# Patient Record
Sex: FEMALE | Race: WHITE | NOT HISPANIC OR LATINO
[De-identification: names, ages, dates, MRNs, and addresses within clinical notes are randomized per-mention and may not be internally consistent; named-entity substitution may affect disease eponyms.]

---

## 2021-12-09 PROBLEM — Z00.00 ENCOUNTER FOR PREVENTIVE HEALTH EXAMINATION: Status: ACTIVE | Noted: 2021-12-09

## 2021-12-13 ENCOUNTER — APPOINTMENT (OUTPATIENT)
Dept: MAMMOGRAPHY | Facility: CLINIC | Age: 61
End: 2021-12-13
Payer: COMMERCIAL

## 2021-12-13 ENCOUNTER — APPOINTMENT (OUTPATIENT)
Dept: ULTRASOUND IMAGING | Facility: CLINIC | Age: 61
End: 2021-12-13
Payer: COMMERCIAL

## 2021-12-13 PROCEDURE — 77063 BREAST TOMOSYNTHESIS BI: CPT

## 2021-12-13 PROCEDURE — 76641 ULTRASOUND BREAST COMPLETE: CPT | Mod: RT

## 2021-12-13 PROCEDURE — 77067 SCR MAMMO BI INCL CAD: CPT

## 2021-12-21 ENCOUNTER — OUTPATIENT (OUTPATIENT)
Dept: OUTPATIENT SERVICES | Facility: HOSPITAL | Age: 61
LOS: 1 days | End: 2021-12-21
Payer: COMMERCIAL

## 2021-12-21 ENCOUNTER — APPOINTMENT (OUTPATIENT)
Dept: ULTRASOUND IMAGING | Facility: IMAGING CENTER | Age: 61
End: 2021-12-21
Payer: COMMERCIAL

## 2021-12-21 DIAGNOSIS — Z00.8 ENCOUNTER FOR OTHER GENERAL EXAMINATION: ICD-10-CM

## 2021-12-21 PROCEDURE — 76642 ULTRASOUND BREAST LIMITED: CPT | Mod: 26,LT

## 2021-12-21 PROCEDURE — 76642 ULTRASOUND BREAST LIMITED: CPT

## 2023-02-27 ENCOUNTER — OFFICE (OUTPATIENT)
Dept: URBAN - METROPOLITAN AREA CLINIC 77 | Facility: CLINIC | Age: 63
Setting detail: OPHTHALMOLOGY
End: 2023-02-27
Payer: COMMERCIAL

## 2023-02-27 ENCOUNTER — OFFICE (OUTPATIENT)
Facility: LOCATION | Age: 63
Setting detail: OPHTHALMOLOGY
End: 2023-02-27
Payer: COMMERCIAL

## 2023-02-27 ENCOUNTER — RX ONLY (RX ONLY)
Age: 63
End: 2023-02-27

## 2023-02-27 DIAGNOSIS — H40.033: ICD-10-CM

## 2023-02-27 DIAGNOSIS — H40.013: ICD-10-CM

## 2023-02-27 DIAGNOSIS — H25.13: ICD-10-CM

## 2023-02-27 PROCEDURE — 99204 OFFICE O/P NEW MOD 45 MIN: CPT | Performed by: OPHTHALMOLOGY

## 2023-02-27 PROCEDURE — 92136 OPHTHALMIC BIOMETRY: CPT | Performed by: OPHTHALMOLOGY

## 2023-02-27 PROCEDURE — 92083 EXTENDED VISUAL FIELD XM: CPT | Performed by: OPHTHALMOLOGY

## 2023-02-27 PROCEDURE — 99214 OFFICE O/P EST MOD 30 MIN: CPT | Performed by: OPHTHALMOLOGY

## 2023-02-27 ASSESSMENT — REFRACTION_AUTOREFRACTION
OD_SPHERE: +1.75
OS_CYLINDER: -4.00
OS_CYLINDER: -4.00
OS_AXIS: 083
OD_AXIS: 084
OS_SPHERE: +1.50
OD_CYLINDER: -3.50
OD_SPHERE: +1.75
OD_AXIS: 084
OS_AXIS: 083
OS_SPHERE: +1.50
OD_CYLINDER: -3.50

## 2023-02-27 ASSESSMENT — CONFRONTATIONAL VISUAL FIELD TEST (CVF)
OS_FINDINGS: FULL
OD_FINDINGS: FULL
OS_FINDINGS: FULL
OD_FINDINGS: FULL

## 2023-02-27 ASSESSMENT — KERATOMETRY
OS_K1POWER_DIOPTERS: 45.75
OS_K1K2_AVERAGE: 46.25
OS_AXISANGLE_DEGREES: 168
OS_AXISANGLE_DEGREES: 78
OD_K2POWER_DIOPTERS: 46.00
OD_K1POWER_DIOPTERS: 45.00
OS_K1POWER_DIOPTERS: 45.75
OS_CYLAXISANGLE_DEGREES: 168
OS_AXISANGLE2_DEGREES: 168
OD_AXISANGLE_DEGREES: 78
OD_K2POWER_DIOPTERS: 46.00
OD_CYLPOWER_DEGREES: 1
OS_K2POWER_DIOPTERS: 46.75
OS_CYLPOWER_DEGREES: 1
OD_CYLAXISANGLE_DEGREES: 168
OS_AXISANGLE_DEGREES: 168
OD_AXISANGLE2_DEGREES: 168
OD_K1POWER_DIOPTERS: 45.00
OS_K2POWER_DIOPTERS: 46.75
OS_K1POWER_DIOPTERS: 45.75
OD_K2POWER_DIOPTERS: 46.00
OD_K1POWER_DIOPTERS: 45.00
OD_AXISANGLE_DEGREES: 168
OD_K1K2_AVERAGE: 45.5
OD_AXISANGLE_DEGREES: 168
OS_K2POWER_DIOPTERS: 46.75

## 2023-02-27 ASSESSMENT — REFRACTION_MANIFEST
OS_SPHERE: +1.75
OS_AXIS: 80
OS_CYLINDER: -3.25
OD_CYLINDER: -3.00
OD_VA1: 20/25
OD_CYLINDER: -3.00
OD_ADD: +3.00
OS_ADD: +3.00
OS_SPHERE: +1.75
OS_AXIS: 80
OD_SPHERE: +2.00
OD_SPHERE: +2.00
OD_VA1: 20/25
OD_AXIS: 80
OS_VA1: 20/30
OD_AXIS: 80
OS_ADD: +3.00
OD_ADD: +3.00
OS_VA1: 20/30
OS_CYLINDER: -3.25

## 2023-02-27 ASSESSMENT — AXIALLENGTH_DERIVED
OS_AL: 22.5786
OS_AL: 22.5786
OS_AL: 22.8038
OD_AL: 22.8795
OD_AL: 22.8795
OS_AL: 22.8038
OD_AL: 22.6977
OD_AL: 22.6977

## 2023-02-27 ASSESSMENT — REFRACTION_CURRENTRX
OS_ADD: +2.75
OS_SPHERE: +2.25
OS_CYLINDER: -2.75
OD_CYLINDER: -3.00
OD_AXIS: 80
OS_ADD: +2.75
OD_OVR_VA: 20/
OD_SPHERE: +2.75
OS_SPHERE: +2.25
OS_OVR_VA: 20/
OD_SPHERE: +2.75
OD_CYLINDER: -3.00
OD_ADD: +2.75
OS_CYLINDER: -2.75
OD_AXIS: 80
OS_AXIS: 75
OD_ADD: +2.75
OS_OVR_VA: 20/
OS_AXIS: 75
OD_OVR_VA: 20/

## 2023-02-27 ASSESSMENT — SPHEQUIV_DERIVED
OS_SPHEQUIV: 0.125
OD_SPHEQUIV: 0.5
OD_SPHEQUIV: 0
OS_SPHEQUIV: -0.5
OD_SPHEQUIV: 0.5
OS_SPHEQUIV: 0.125
OD_SPHEQUIV: 0
OS_SPHEQUIV: -0.5

## 2023-02-27 ASSESSMENT — VISUAL ACUITY
OD_BCVA: 20/60
OS_BCVA: 20/60
OS_BCVA: 20/60
OD_BCVA: 20/60

## 2023-03-08 ENCOUNTER — APPOINTMENT (OUTPATIENT)
Dept: ULTRASOUND IMAGING | Facility: CLINIC | Age: 63
End: 2023-03-08
Payer: COMMERCIAL

## 2023-03-08 ENCOUNTER — APPOINTMENT (OUTPATIENT)
Dept: MAMMOGRAPHY | Facility: CLINIC | Age: 63
End: 2023-03-08
Payer: COMMERCIAL

## 2023-03-08 PROCEDURE — 76641 ULTRASOUND BREAST COMPLETE: CPT | Mod: RT

## 2023-03-08 PROCEDURE — 77067 SCR MAMMO BI INCL CAD: CPT

## 2023-03-08 PROCEDURE — 77063 BREAST TOMOSYNTHESIS BI: CPT

## 2023-03-17 ENCOUNTER — OFFICE (OUTPATIENT)
Facility: LOCATION | Age: 63
Setting detail: OPHTHALMOLOGY
End: 2023-03-17
Payer: COMMERCIAL

## 2023-03-17 DIAGNOSIS — H25.12: ICD-10-CM

## 2023-03-17 DIAGNOSIS — H25.13: ICD-10-CM

## 2023-03-17 DIAGNOSIS — H25.11: ICD-10-CM

## 2023-03-17 PROCEDURE — 99213 OFFICE O/P EST LOW 20 MIN: CPT | Performed by: OPHTHALMOLOGY

## 2023-03-17 ASSESSMENT — KERATOMETRY
OS_K1POWER_DIOPTERS: 45.75
OS_CYLPOWER_DEGREES: 1
OD_K2POWER_DIOPTERS: 46.00
OD_AXISANGLE2_DEGREES: 168
OS_K2POWER_DIOPTERS: 46.75
OS_AXISANGLE_DEGREES: 168
OD_CYLAXISANGLE_DEGREES: 168
OS_K1POWER_DIOPTERS: 45.75
OS_CYLAXISANGLE_DEGREES: 168
OS_K2POWER_DIOPTERS: 46.75
OD_K2POWER_DIOPTERS: 46.00
OS_AXISANGLE2_DEGREES: 168
OD_K1POWER_DIOPTERS: 45.00
OS_AXISANGLE_DEGREES: 78
OD_AXISANGLE_DEGREES: 78
OS_K1K2_AVERAGE: 46.25
OD_K1K2_AVERAGE: 45.5
OD_K1POWER_DIOPTERS: 45.00
OD_CYLPOWER_DEGREES: 1
OD_AXISANGLE_DEGREES: 168

## 2023-03-17 ASSESSMENT — REFRACTION_CURRENTRX
OS_OVR_VA: 20/
OS_AXIS: 085
OS_SPHERE: +2.50
OD_CYLINDER: -3.00
OS_CYLINDER: -2.75
OD_AXIS: 87
OD_ADD: +2.75
OD_SPHERE: +2.75
OS_ADD: +2.75
OD_OVR_VA: 20/

## 2023-03-17 ASSESSMENT — REFRACTION_MANIFEST
OS_SPHERE: +1.75
OD_ADD: +3.00
OS_AXIS: 80
OD_AXIS: 80
OD_SPHERE: +2.00
OS_CYLINDER: -3.25
OS_ADD: +3.00
OS_VA1: 20/30
OD_VA1: 20/25
OD_CYLINDER: -3.00

## 2023-03-17 ASSESSMENT — REFRACTION_AUTOREFRACTION
OS_AXIS: 082
OS_SPHERE: +1.25
OD_SPHERE: +1.75
OD_CYLINDER: -3.25
OD_AXIS: 082
OS_CYLINDER: -3.25

## 2023-03-17 ASSESSMENT — VISUAL ACUITY
OD_BCVA: 20/60
OS_BCVA: 20/60

## 2023-03-17 ASSESSMENT — SPHEQUIV_DERIVED
OS_SPHEQUIV: 0.125
OD_SPHEQUIV: 0.5
OS_SPHEQUIV: -0.375
OD_SPHEQUIV: 0.125

## 2023-03-17 ASSESSMENT — CONFRONTATIONAL VISUAL FIELD TEST (CVF)
OS_FINDINGS: FULL
OD_FINDINGS: FULL

## 2023-03-17 ASSESSMENT — AXIALLENGTH_DERIVED
OD_AL: 22.6977
OD_AL: 22.8338
OS_AL: 22.5786
OS_AL: 22.7584

## 2023-05-02 ENCOUNTER — ASC (OUTPATIENT)
Dept: URBAN - METROPOLITAN AREA SURGERY 8 | Facility: SURGERY | Age: 63
Setting detail: OPHTHALMOLOGY
End: 2023-05-02
Payer: COMMERCIAL

## 2023-05-02 DIAGNOSIS — H25.11: ICD-10-CM

## 2023-05-02 DIAGNOSIS — H52.211: ICD-10-CM

## 2023-05-02 PROCEDURE — FEMTO FEMTOSECOND LASER: Performed by: OPHTHALMOLOGY

## 2023-05-02 PROCEDURE — 66984 XCAPSL CTRC RMVL W/O ECP: CPT | Performed by: OPHTHALMOLOGY

## 2023-05-03 ENCOUNTER — RX ONLY (RX ONLY)
Age: 63
End: 2023-05-03

## 2023-05-03 ENCOUNTER — OFFICE (OUTPATIENT)
Facility: LOCATION | Age: 63
Setting detail: OPHTHALMOLOGY
End: 2023-05-03
Payer: COMMERCIAL

## 2023-05-03 DIAGNOSIS — Z96.1: ICD-10-CM

## 2023-05-03 DIAGNOSIS — H25.12: ICD-10-CM

## 2023-05-03 PROCEDURE — 92136 OPHTHALMIC BIOMETRY: CPT | Performed by: OPHTHALMOLOGY

## 2023-05-03 PROCEDURE — 99024 POSTOP FOLLOW-UP VISIT: CPT | Performed by: OPHTHALMOLOGY

## 2023-05-03 ASSESSMENT — REFRACTION_MANIFEST
OS_CYLINDER: -3.25
OD_AXIS: 80
OS_SPHERE: +1.75
OS_VA1: 20/30
OS_ADD: +3.00
OD_VA1: 20/25
OS_AXIS: 80
OD_ADD: +3.00
OD_CYLINDER: -3.00
OD_SPHERE: +2.00

## 2023-05-03 ASSESSMENT — AXIALLENGTH_DERIVED
OS_AL: 22.5366
OD_AL: 23.2437
OD_AL: 22.8259
OS_AL: 22.9438

## 2023-05-03 ASSESSMENT — REFRACTION_CURRENTRX
OS_OVR_VA: 20/
OS_AXIS: 085
OD_OVR_VA: 20/
OS_SPHERE: +2.50
OD_CYLINDER: -3.00
OS_ADD: +2.75
OD_ADD: +2.75
OS_CYLINDER: -2.75
OD_SPHERE: +2.75
OD_AXIS: 87

## 2023-05-03 ASSESSMENT — KERATOMETRY
OS_K1POWER_DIOPTERS: 47.00
OS_AXISANGLE_DEGREES: 80
OD_K2POWER_DIOPTERS: 46.00
OD_K1POWER_DIOPTERS: 44.25
OD_AXISANGLE_DEGREES: 59
OS_K2POWER_DIOPTERS: 45.75

## 2023-05-03 ASSESSMENT — REFRACTION_AUTOREFRACTION
OD_CYLINDER: -0.75
OD_AXIS: 04
OS_SPHERE: +0.25
OD_SPHERE: -0.25
OS_AXIS: 87
OS_CYLINDER: -2.50

## 2023-05-03 ASSESSMENT — CONFRONTATIONAL VISUAL FIELD TEST (CVF)
OD_FINDINGS: FULL
OS_FINDINGS: FULL

## 2023-05-03 ASSESSMENT — SPHEQUIV_DERIVED
OD_SPHEQUIV: 0.5
OS_SPHEQUIV: 0.125
OS_SPHEQUIV: -1
OD_SPHEQUIV: -0.625

## 2023-05-03 ASSESSMENT — VISUAL ACUITY
OD_BCVA: 20/70
OS_BCVA: 20/30-2

## 2023-05-16 ENCOUNTER — ASC (OUTPATIENT)
Dept: URBAN - METROPOLITAN AREA SURGERY 8 | Facility: SURGERY | Age: 63
Setting detail: OPHTHALMOLOGY
End: 2023-05-16
Payer: COMMERCIAL

## 2023-05-16 DIAGNOSIS — H25.12: ICD-10-CM

## 2023-05-16 DIAGNOSIS — H52.212: ICD-10-CM

## 2023-05-16 PROCEDURE — 66984 XCAPSL CTRC RMVL W/O ECP: CPT | Performed by: OPHTHALMOLOGY

## 2023-05-16 PROCEDURE — FEMTO FEMTOSECOND LASER: Performed by: OPHTHALMOLOGY

## 2023-05-17 ENCOUNTER — OFFICE (OUTPATIENT)
Facility: LOCATION | Age: 63
Setting detail: OPHTHALMOLOGY
End: 2023-05-17
Payer: COMMERCIAL

## 2023-05-17 DIAGNOSIS — Z96.1: ICD-10-CM

## 2023-05-17 PROCEDURE — 99024 POSTOP FOLLOW-UP VISIT: CPT | Performed by: OPHTHALMOLOGY

## 2023-05-17 ASSESSMENT — REFRACTION_CURRENTRX
OD_OVR_VA: 20/
OD_ADD: +2.75
OD_AXIS: 87
OS_AXIS: 085
OS_OVR_VA: 20/
OS_ADD: +2.75
OS_CYLINDER: -2.75
OS_SPHERE: +2.50
OD_SPHERE: +2.75
OD_CYLINDER: -3.00

## 2023-05-17 ASSESSMENT — KERATOMETRY
OS_K2POWER_DIOPTERS: 46.75
OD_K1POWER_DIOPTERS: 45.25
OD_AXISANGLE_DEGREES: 118
OD_K2POWER_DIOPTERS: 44.00
OS_K1POWER_DIOPTERS: 46.00
OS_AXISANGLE_DEGREES: 75

## 2023-05-17 ASSESSMENT — REFRACTION_MANIFEST
OD_CYLINDER: -3.00
OD_AXIS: 80
OS_AXIS: 80
OS_SPHERE: +1.75
OD_ADD: +3.00
OS_ADD: +3.00
OD_VA1: 20/25
OS_VA1: 20/30
OS_CYLINDER: -3.25
OD_SPHERE: +2.00

## 2023-05-17 ASSESSMENT — REFRACTION_AUTOREFRACTION
OS_AXIS: 11
OS_CYLINDER: -0.50
OD_CYLINDER: -1.00
OD_AXIS: 114
OD_SPHERE: 0.00
OS_SPHERE: -0.25

## 2023-05-17 ASSESSMENT — CONFRONTATIONAL VISUAL FIELD TEST (CVF)
OS_FINDINGS: FULL
OD_FINDINGS: FULL

## 2023-05-17 ASSESSMENT — VISUAL ACUITY
OD_BCVA: 20/30-2
OS_BCVA: 20/25

## 2023-05-17 ASSESSMENT — AXIALLENGTH_DERIVED
OS_AL: 22.761
OD_AL: 22.9992
OS_AL: 22.5366
OD_AL: 23.3755

## 2023-05-17 ASSESSMENT — SPHEQUIV_DERIVED
OD_SPHEQUIV: 0.5
OS_SPHEQUIV: -0.5
OD_SPHEQUIV: -0.5
OS_SPHEQUIV: 0.125

## 2023-06-13 ENCOUNTER — OFFICE (OUTPATIENT)
Facility: LOCATION | Age: 63
Setting detail: OPHTHALMOLOGY
End: 2023-06-13
Payer: COMMERCIAL

## 2023-06-13 DIAGNOSIS — Z96.1: ICD-10-CM

## 2023-06-13 DIAGNOSIS — H52.4: ICD-10-CM

## 2023-06-13 DIAGNOSIS — H02.403: ICD-10-CM

## 2023-06-13 PROCEDURE — 99024 POSTOP FOLLOW-UP VISIT: CPT | Performed by: OPHTHALMOLOGY

## 2023-06-13 PROCEDURE — 92015 DETERMINE REFRACTIVE STATE: CPT | Performed by: OPHTHALMOLOGY

## 2023-06-13 ASSESSMENT — REFRACTION_MANIFEST
OS_VA1: 20/30
OS_CYLINDER: -3.25
OD_CYLINDER: -0.50
OD_SPHERE: +2.00
OD_AXIS: 80
OS_AXIS: 80
OS_ADD: +2.00
OD_CYLINDER: -3.00
OS_ADD: +3.00
OD_VA1: 20/25
OD_SPHERE: +0.25
OD_ADD: +3.00
OS_SPHERE: +1.75
OD_AXIS: 110
OS_SPHERE: -0.25
OS_CYLINDER: SPH
OD_ADD: +2.00

## 2023-06-13 ASSESSMENT — AXIALLENGTH_DERIVED
OD_AL: 22.869
OS_AL: 22.9767
OD_AL: 23.0071
OS_AL: 22.7481
OD_AL: 23.0535

## 2023-06-13 ASSESSMENT — REFRACTION_CURRENTRX
OS_OVR_VA: 20/
OD_SPHERE: +2.75
OS_CYLINDER: -2.75
OD_OVR_VA: 20/
OD_CYLINDER: -3.00
OS_ADD: +2.75
OS_AXIS: 085
OS_SPHERE: +2.50
OD_AXIS: 87
OD_ADD: +2.75

## 2023-06-13 ASSESSMENT — KERATOMETRY
OD_K2POWER_DIOPTERS: 44.25
OD_AXISANGLE_DEGREES: 127
OS_AXISANGLE_DEGREES: 105
OD_K1POWER_DIOPTERS: 45.75
OS_K1POWER_DIOPTERS: 46.25
OS_K2POWER_DIOPTERS: 45.25

## 2023-06-13 ASSESSMENT — REFRACTION_AUTOREFRACTION
OS_CYLINDER: -0.50
OD_AXIS: 119
OD_SPHERE: +1.00
OS_SPHERE: -0.25
OS_AXIS: 074
OD_CYLINDER: -1.75

## 2023-06-13 ASSESSMENT — SPHEQUIV_DERIVED
OD_SPHEQUIV: 0
OD_SPHEQUIV: 0.5
OS_SPHEQUIV: -0.5
OD_SPHEQUIV: 0.125
OS_SPHEQUIV: 0.125

## 2023-06-13 ASSESSMENT — VISUAL ACUITY
OD_BCVA: 20/25-
OS_BCVA: 20/25-

## 2023-06-13 ASSESSMENT — TONOMETRY
OD_IOP_MMHG: 15
OS_IOP_MMHG: 17

## 2023-06-13 ASSESSMENT — LID POSITION - PTOSIS
OD_PTOSIS: RUL 1+ 2+
OS_PTOSIS: LUL 1+ 2+

## 2023-06-13 ASSESSMENT — CONFRONTATIONAL VISUAL FIELD TEST (CVF)
OD_FINDINGS: FULL
OS_FINDINGS: FULL

## 2023-07-07 ENCOUNTER — OFFICE (OUTPATIENT)
Dept: URBAN - METROPOLITAN AREA CLINIC 35 | Facility: CLINIC | Age: 63
Setting detail: OPHTHALMOLOGY
End: 2023-07-07
Payer: COMMERCIAL

## 2023-07-07 DIAGNOSIS — H02.403: ICD-10-CM

## 2023-07-07 DIAGNOSIS — H53.40: ICD-10-CM

## 2023-07-07 PROCEDURE — 92285 EXTERNAL OCULAR PHOTOGRAPHY: CPT | Performed by: OPHTHALMOLOGY

## 2023-07-07 PROCEDURE — 92012 INTRM OPH EXAM EST PATIENT: CPT | Performed by: OPHTHALMOLOGY

## 2023-07-07 ASSESSMENT — VISUAL ACUITY
OD_BCVA: 20/25
OS_BCVA: 20/25

## 2023-07-07 ASSESSMENT — KERATOMETRY
OD_K2POWER_DIOPTERS: 44.25
OD_AXISANGLE_DEGREES: 127
OS_K2POWER_DIOPTERS: 45.25
OS_K1POWER_DIOPTERS: 46.25
OS_AXISANGLE_DEGREES: 105
OD_K1POWER_DIOPTERS: 45.75

## 2023-07-07 ASSESSMENT — REFRACTION_AUTOREFRACTION
OS_AXIS: 074
OD_AXIS: 119
OS_CYLINDER: -0.50
OS_SPHERE: -0.25
OD_CYLINDER: -1.75
OD_SPHERE: +1.00

## 2023-07-07 ASSESSMENT — SPHEQUIV_DERIVED
OS_SPHEQUIV: -0.5
OD_SPHEQUIV: 0.125

## 2023-07-07 ASSESSMENT — CONFRONTATIONAL VISUAL FIELD TEST (CVF)
OS_FINDINGS: FULL
OD_FINDINGS: FULL

## 2023-07-07 ASSESSMENT — AXIALLENGTH_DERIVED
OD_AL: 23.0071
OS_AL: 22.9767

## 2023-07-07 ASSESSMENT — LID POSITION - PTOSIS
OD_PTOSIS: RUL
OS_PTOSIS: LUL

## 2023-08-31 ENCOUNTER — APPOINTMENT (OUTPATIENT)
Dept: ORTHOPEDIC SURGERY | Facility: CLINIC | Age: 63
End: 2023-08-31
Payer: COMMERCIAL

## 2023-08-31 VITALS — BODY MASS INDEX: 25.68 KG/M2 | HEIGHT: 61 IN | WEIGHT: 136 LBS

## 2023-08-31 DIAGNOSIS — C80.1 MALIGNANT (PRIMARY) NEOPLASM, UNSPECIFIED: ICD-10-CM

## 2023-08-31 PROCEDURE — 99204 OFFICE O/P NEW MOD 45 MIN: CPT

## 2023-08-31 NOTE — DATA REVIEWED
[MRI] : MRI [Right] : of the right [Shoulder] : shoulder [Report was reviewed and noted in the chart] : The report was reviewed and noted in the chart [I independently reviewed and interpreted images and report] : I independently reviewed and interpreted images and report [I reviewed the films/CD] : I reviewed the films/CD [FreeTextEntry1] : 08.10.23 (ZP)  1. Findings that can be seen in the setting of adhesive capsulitis. 2. Nondisplaced superior labrum tear. Partial intra-articular biceps tendon tear and tenosynovitis. 3. Mild-moderate supraspinatus tendinosis. Shallow articular cranial subscapularis tendon tear. Trace subacromial-subdeltoid bursitis.

## 2023-08-31 NOTE — DISCUSSION/SUMMARY
[de-identified] : 63f with right shoulder adhesive capsulitis, prct 1) discussed the availability of csi - pt defers today 2) start physical therapy 3) cryotherapy, rest and activity modification 4) rtc 6 weeks  Entered by Belia Caballero acting as scribe. Dr. Wilson- The documentation recorded by the scribe accurately reflects the service I personally performed and the decisions made by me.

## 2023-08-31 NOTE — PHYSICAL EXAM
[Right] : right shoulder [5 ___] : forward flexion 5[unfilled]/5 [5___] : internal rotation 5[unfilled]/5 [] : motor and sensory intact distally [TWNoteComboBox7] : active forward flexion 160 degrees [de-identified] : active abduction 100 degrees [TWNoteComboBox6] : internal rotation sacrum [de-identified] : external rotation at 90 degrees of abduction 80 degrees [TWNoteComboBox5] : internal rotation at 90 degrees of abduction 45 degrees

## 2023-08-31 NOTE — HISTORY OF PRESENT ILLNESS
[Gradual] : gradual [6] : 6 [5] : 5 [Dull/Aching] : dull/aching [Sharp] : sharp [Constant] : constant [Leisure] : leisure [de-identified] : 08/31/2023 Ms. ELLEN NAJJAR, a 63 year old female, presents today for right shoulder. Reports right shoulder pain present over the past 6 months - 1 year. Reports pain and difficulty with reaching behind and overhead.  Underwent an MRI of the right shoulder which is reviewed today.  Hx of weakness of the RUE and was found to have cervical stenosis on MRI.  [] : no [FreeTextEntry1] : Rt shoulder  [FreeTextEntry5] : Patient is here for RT shoulder pain. No prior injury. Pain started about a year. ROM limited.  [de-identified] : MRI

## 2023-09-22 ENCOUNTER — OFFICE (OUTPATIENT)
Dept: URBAN - METROPOLITAN AREA CLINIC 35 | Facility: CLINIC | Age: 63
Setting detail: OPHTHALMOLOGY
End: 2023-09-22
Payer: COMMERCIAL

## 2023-09-22 DIAGNOSIS — H02.403: ICD-10-CM

## 2023-09-22 DIAGNOSIS — H53.40: ICD-10-CM

## 2023-09-22 PROCEDURE — 92012 INTRM OPH EXAM EST PATIENT: CPT | Performed by: OPHTHALMOLOGY

## 2023-09-22 PROCEDURE — 92081 LIMITED VISUAL FIELD XM: CPT | Performed by: OPHTHALMOLOGY

## 2023-09-22 ASSESSMENT — REFRACTION_AUTOREFRACTION
OS_AXIS: 074
OS_CYLINDER: -0.50
OD_SPHERE: +1.00
OD_AXIS: 119
OD_CYLINDER: -1.75
OS_SPHERE: -0.25

## 2023-09-22 ASSESSMENT — SPHEQUIV_DERIVED
OD_SPHEQUIV: 0.125
OS_SPHEQUIV: -0.5

## 2023-09-22 ASSESSMENT — VISUAL ACUITY
OD_BCVA: 20/25
OS_BCVA: 20/25

## 2023-09-22 ASSESSMENT — CONFRONTATIONAL VISUAL FIELD TEST (CVF)
OD_FINDINGS: FULL
OS_FINDINGS: FULL

## 2023-09-22 ASSESSMENT — KERATOMETRY
OD_K2POWER_DIOPTERS: 44.25
OD_K1POWER_DIOPTERS: 45.75
OD_AXISANGLE_DEGREES: 127
OS_K2POWER_DIOPTERS: 45.25
OS_K1POWER_DIOPTERS: 46.25
OS_AXISANGLE_DEGREES: 105

## 2023-09-22 ASSESSMENT — LID POSITION - PTOSIS
OD_PTOSIS: RUL
OS_PTOSIS: LUL

## 2023-09-22 ASSESSMENT — AXIALLENGTH_DERIVED
OD_AL: 23.0071
OS_AL: 22.9767

## 2023-10-09 ENCOUNTER — OFFICE (OUTPATIENT)
Facility: LOCATION | Age: 63
Setting detail: OPHTHALMOLOGY
End: 2023-10-09
Payer: COMMERCIAL

## 2023-10-09 DIAGNOSIS — H26.491: ICD-10-CM

## 2023-10-09 PROBLEM — H02.403 PTOSIS OF EYELID, UNSPECIFIED; BOTH EYES: Status: ACTIVE | Noted: 2023-09-22

## 2023-10-09 PROCEDURE — 92012 INTRM OPH EXAM EST PATIENT: CPT | Performed by: OPHTHALMOLOGY

## 2023-10-09 ASSESSMENT — KERATOMETRY
OS_K1POWER_DIOPTERS: 46.25
OD_AXISANGLE_DEGREES: 134
OS_K2POWER_DIOPTERS: 45.00
OS_AXISANGLE_DEGREES: 083
OD_K2POWER_DIOPTERS: 45.00
OD_K1POWER_DIOPTERS: 46.25

## 2023-10-09 ASSESSMENT — VISUAL ACUITY
OD_BCVA: 20/30
OS_BCVA: 20/40

## 2023-10-09 ASSESSMENT — REFRACTION_AUTOREFRACTION
OD_CYLINDER: -2.75
OS_CYLINDER: -1.00
OS_SPHERE: 0.00
OS_AXIS: 070
OD_SPHERE: +1.50
OD_AXIS: 109

## 2023-10-09 ASSESSMENT — SPHEQUIV_DERIVED
OD_SPHEQUIV: 0.125
OS_SPHEQUIV: -0.5

## 2023-10-09 ASSESSMENT — LID POSITION - PTOSIS
OD_PTOSIS: RUL
OS_PTOSIS: LUL

## 2023-10-09 ASSESSMENT — CONFRONTATIONAL VISUAL FIELD TEST (CVF)
OS_FINDINGS: FULL
OD_FINDINGS: FULL

## 2023-10-09 ASSESSMENT — AXIALLENGTH_DERIVED
OS_AL: 23.0204
OD_AL: 22.7908

## 2023-11-07 ENCOUNTER — OFFICE (OUTPATIENT)
Dept: URBAN - METROPOLITAN AREA CLINIC 35 | Facility: CLINIC | Age: 63
Setting detail: OPHTHALMOLOGY
End: 2023-11-07
Payer: COMMERCIAL

## 2023-11-07 DIAGNOSIS — H53.40: ICD-10-CM

## 2023-11-07 DIAGNOSIS — H04.122: ICD-10-CM

## 2023-11-07 DIAGNOSIS — H04.123: ICD-10-CM

## 2023-11-07 DIAGNOSIS — H02.403: ICD-10-CM

## 2023-11-07 DIAGNOSIS — H04.121: ICD-10-CM

## 2023-11-07 PROCEDURE — 83861 MICROFLUID ANALY TEARS: CPT | Mod: RT | Performed by: OPHTHALMOLOGY

## 2023-11-07 PROCEDURE — 83861 MICROFLUID ANALY TEARS: CPT | Mod: LT | Performed by: OPHTHALMOLOGY

## 2023-11-07 PROCEDURE — 92012 INTRM OPH EXAM EST PATIENT: CPT | Performed by: OPHTHALMOLOGY

## 2023-11-07 ASSESSMENT — REFRACTION_AUTOREFRACTION
OD_AXIS: 109
OS_CYLINDER: -1.00
OD_CYLINDER: -2.75
OD_SPHERE: +1.50
OS_AXIS: 070
OS_SPHERE: 0.00

## 2023-11-07 ASSESSMENT — CONFRONTATIONAL VISUAL FIELD TEST (CVF)
OD_FINDINGS: FULL
OS_FINDINGS: FULL

## 2023-11-07 ASSESSMENT — TEAR BREAK UP TIME (TBUT)
OS_TBUT: 2+
OD_TBUT: 2+

## 2023-11-07 ASSESSMENT — SPHEQUIV_DERIVED
OD_SPHEQUIV: 0.125
OS_SPHEQUIV: -0.5

## 2023-11-07 ASSESSMENT — LID POSITION - PTOSIS
OS_PTOSIS: LUL
OD_PTOSIS: RUL

## 2023-11-09 ENCOUNTER — APPOINTMENT (OUTPATIENT)
Dept: ORTHOPEDIC SURGERY | Facility: CLINIC | Age: 63
End: 2023-11-09
Payer: COMMERCIAL

## 2023-11-09 VITALS — BODY MASS INDEX: 25.68 KG/M2 | HEIGHT: 61 IN | WEIGHT: 136 LBS

## 2023-11-09 PROCEDURE — 99213 OFFICE O/P EST LOW 20 MIN: CPT

## 2023-11-10 ENCOUNTER — OFFICE (OUTPATIENT)
Dept: URBAN - METROPOLITAN AREA CLINIC 35 | Facility: CLINIC | Age: 63
Setting detail: OPHTHALMOLOGY
End: 2023-11-10
Payer: COMMERCIAL

## 2023-11-10 DIAGNOSIS — H02.403: ICD-10-CM

## 2023-11-10 PROCEDURE — 67908 REPAIR EYELID DEFECT: CPT | Mod: 50 | Performed by: OPHTHALMOLOGY

## 2023-11-10 ASSESSMENT — REFRACTION_AUTOREFRACTION
OS_CYLINDER: -1.00
OS_SPHERE: 0.00
OD_AXIS: 109
OD_SPHERE: +1.50
OS_AXIS: 070
OD_CYLINDER: -2.75

## 2023-11-10 ASSESSMENT — CONFRONTATIONAL VISUAL FIELD TEST (CVF)
OD_FINDINGS: FULL
OS_FINDINGS: FULL

## 2023-11-10 ASSESSMENT — LID POSITION - PTOSIS
OS_PTOSIS: LUL
OD_PTOSIS: RUL

## 2023-11-10 ASSESSMENT — SPHEQUIV_DERIVED
OS_SPHEQUIV: -0.5
OD_SPHEQUIV: 0.125

## 2023-11-17 ENCOUNTER — OFFICE (OUTPATIENT)
Dept: URBAN - METROPOLITAN AREA CLINIC 35 | Facility: CLINIC | Age: 63
Setting detail: OPHTHALMOLOGY
End: 2023-11-17
Payer: COMMERCIAL

## 2023-11-17 DIAGNOSIS — H02.403: ICD-10-CM

## 2023-11-17 PROCEDURE — 99024 POSTOP FOLLOW-UP VISIT: CPT | Performed by: OPHTHALMOLOGY

## 2023-11-17 ASSESSMENT — TEAR BREAK UP TIME (TBUT)
OS_TBUT: 2+
OD_TBUT: 2+

## 2023-11-17 ASSESSMENT — REFRACTION_AUTOREFRACTION
OS_SPHERE: 0.00
OD_SPHERE: +1.50
OD_CYLINDER: -2.75
OS_CYLINDER: -1.00
OS_AXIS: 070
OD_AXIS: 109

## 2023-11-17 ASSESSMENT — SPHEQUIV_DERIVED
OD_SPHEQUIV: 0.125
OS_SPHEQUIV: -0.5

## 2023-11-17 ASSESSMENT — CONFRONTATIONAL VISUAL FIELD TEST (CVF)
OD_FINDINGS: FULL
OS_FINDINGS: FULL

## 2023-11-17 ASSESSMENT — LID POSITION - PTOSIS
OD_PTOSIS: ABSENT
OS_PTOSIS: ABSENT

## 2023-11-24 ENCOUNTER — APPOINTMENT (OUTPATIENT)
Dept: ORTHOPEDIC SURGERY | Facility: CLINIC | Age: 63
End: 2023-11-24
Payer: COMMERCIAL

## 2023-11-24 VITALS — BODY MASS INDEX: 25.68 KG/M2 | HEIGHT: 61 IN | WEIGHT: 136 LBS

## 2023-11-24 DIAGNOSIS — M75.01 ADHESIVE CAPSULITIS OF RIGHT SHOULDER: ICD-10-CM

## 2023-11-24 DIAGNOSIS — M75.111 INCOMPLETE ROTATOR CUFF TEAR OR RUPTURE OF RIGHT SHOULDER, NOT SPECIFIED AS TRAUMATIC: ICD-10-CM

## 2023-11-24 PROCEDURE — J3490M: CUSTOM

## 2023-11-24 PROCEDURE — 99214 OFFICE O/P EST MOD 30 MIN: CPT | Mod: 25

## 2023-11-24 PROCEDURE — 20611 DRAIN/INJ JOINT/BURSA W/US: CPT | Mod: RT

## 2023-11-26 PROBLEM — M75.111 NONTRAUMATIC INCOMPLETE TEAR OF RIGHT ROTATOR CUFF: Status: ACTIVE | Noted: 2023-08-31

## 2023-11-26 PROBLEM — M75.01 ADHESIVE CAPSULITIS OF RIGHT SHOULDER: Status: ACTIVE | Noted: 2023-08-31

## 2024-01-12 ENCOUNTER — OFFICE (OUTPATIENT)
Dept: URBAN - METROPOLITAN AREA CLINIC 35 | Facility: CLINIC | Age: 64
Setting detail: OPHTHALMOLOGY
End: 2024-01-12
Payer: COMMERCIAL

## 2024-01-12 DIAGNOSIS — H02.403: ICD-10-CM

## 2024-01-12 DIAGNOSIS — H04.123: ICD-10-CM

## 2024-01-12 PROCEDURE — 99024 POSTOP FOLLOW-UP VISIT: CPT | Performed by: OPHTHALMOLOGY

## 2024-01-12 ASSESSMENT — REFRACTION_AUTOREFRACTION
OS_AXIS: 070
OD_SPHERE: +1.50
OD_CYLINDER: -2.75
OD_AXIS: 109
OS_CYLINDER: -1.00
OS_SPHERE: 0.00

## 2024-01-12 ASSESSMENT — TEAR BREAK UP TIME (TBUT)
OS_TBUT: 2+
OD_TBUT: 2+

## 2024-01-12 ASSESSMENT — LID POSITION - PTOSIS
OD_PTOSIS: ABSENT
OS_PTOSIS: ABSENT

## 2024-01-12 ASSESSMENT — SPHEQUIV_DERIVED
OS_SPHEQUIV: -0.5
OD_SPHEQUIV: 0.125

## 2024-09-03 ENCOUNTER — OFFICE (OUTPATIENT)
Dept: URBAN - METROPOLITAN AREA CLINIC 77 | Facility: CLINIC | Age: 64
Setting detail: OPHTHALMOLOGY
End: 2024-09-03
Payer: MEDICARE

## 2024-09-03 DIAGNOSIS — H16.223: ICD-10-CM

## 2024-09-03 DIAGNOSIS — H40.033: ICD-10-CM

## 2024-09-03 DIAGNOSIS — H02.403: ICD-10-CM

## 2024-09-03 DIAGNOSIS — H26.493: ICD-10-CM

## 2024-09-03 DIAGNOSIS — Z96.1: ICD-10-CM

## 2024-09-03 PROCEDURE — 99214 OFFICE O/P EST MOD 30 MIN: CPT | Performed by: OPHTHALMOLOGY

## 2024-09-03 ASSESSMENT — LID POSITION - PTOSIS
OS_PTOSIS: ABSENT
OD_PTOSIS: ABSENT

## 2024-09-03 ASSESSMENT — CONFRONTATIONAL VISUAL FIELD TEST (CVF)
OD_FINDINGS: FULL
OS_FINDINGS: FULL